# Patient Record
Sex: FEMALE | Race: OTHER | Employment: OTHER | RURAL
[De-identification: names, ages, dates, MRNs, and addresses within clinical notes are randomized per-mention and may not be internally consistent; named-entity substitution may affect disease eponyms.]

---

## 2018-02-26 NOTE — PATIENT DISCUSSION
New Prescription: TobraDex (tobramycin-dexamethasone): drops,suspension: 0.3-0.1% 1 drop four times a day into right eye 02-

## 2018-02-26 NOTE — PATIENT DISCUSSION
CONJUNCTIVITIS, VIRAL OD: BETADINE RINSE WITH PCP PER PT. PRESCRIBED TOBRADEX QID x 1 week. RECOMMENDED COOL COMPRESSES AND ATS BID/PRN. RETURN TO CLINIC AS SCHEDULED/SOONER IF SYMPTOMS INCREASE.

## 2018-03-13 NOTE — PATIENT DISCUSSION
CONJUNCTIVITIS, VIRAL OD:  RESOLVING. RECOMMENDED COOL COMPRESSES AND ATS BID/PRN. RETURN TO CLINIC AS SCHEDULED/SOONER IF SYMPTOMS INCREASE.

## 2018-03-13 NOTE — PATIENT DISCUSSION
SUPERFICIAL PUNCTATE KERATITIS OD: RX ARTIFICIAL TEARS AS NEEDED TO INCREASE COMFORT OD. RTC IF SYMPTOMS PERSIST.

## 2022-02-03 ENCOUNTER — NEW PATIENT (OUTPATIENT)
Dept: RURAL CLINIC 2 | Facility: CLINIC | Age: 62
End: 2022-02-03

## 2022-02-03 DIAGNOSIS — H52.223: ICD-10-CM

## 2022-02-03 DIAGNOSIS — Z79.4: ICD-10-CM

## 2022-02-03 DIAGNOSIS — H52.13: ICD-10-CM

## 2022-02-03 DIAGNOSIS — H52.4: ICD-10-CM

## 2022-02-03 DIAGNOSIS — E11.9: ICD-10-CM

## 2022-02-03 DIAGNOSIS — H25.813: ICD-10-CM

## 2022-02-03 PROCEDURE — 92004 COMPRE OPH EXAM NEW PT 1/>: CPT

## 2022-02-03 PROCEDURE — 92015 DETERMINE REFRACTIVE STATE: CPT

## 2022-02-03 ASSESSMENT — TONOMETRY
OS_IOP_MMHG: 18
OD_IOP_MMHG: 18

## 2022-02-03 ASSESSMENT — VISUAL ACUITY
OD_CC: 20/50-1
OS_PH: 20/40
OS_CC: 20/40-2
OD_PH: 20/25-2

## 2023-10-03 ENCOUNTER — ESTABLISHED PATIENT (OUTPATIENT)
Dept: RURAL CLINIC 2 | Facility: CLINIC | Age: 63
End: 2023-10-03

## 2023-10-03 DIAGNOSIS — E11.9: ICD-10-CM

## 2023-10-03 DIAGNOSIS — Z79.4: ICD-10-CM

## 2023-10-03 DIAGNOSIS — H25.813: ICD-10-CM

## 2023-10-03 DIAGNOSIS — H43.811: ICD-10-CM

## 2023-10-03 DIAGNOSIS — H43.822: ICD-10-CM

## 2023-10-03 PROCEDURE — 99214 OFFICE O/P EST MOD 30 MIN: CPT

## 2023-10-03 PROCEDURE — 92134 CPTRZ OPH DX IMG PST SGM RTA: CPT

## 2023-10-03 ASSESSMENT — VISUAL ACUITY
OD_PH: 20/30+1
OD_CC: 20/50-1
OS_CC: 20/30-1

## 2023-10-03 ASSESSMENT — TONOMETRY
OS_IOP_MMHG: 18
OD_IOP_MMHG: 18

## 2023-10-25 ENCOUNTER — CONSULTATION/EVALUATION (OUTPATIENT)
Dept: RURAL CLINIC 1 | Facility: CLINIC | Age: 63
End: 2023-10-25

## 2023-10-25 DIAGNOSIS — H25.813: ICD-10-CM

## 2023-10-25 PROCEDURE — 99214 OFFICE O/P EST MOD 30 MIN: CPT

## 2023-10-25 ASSESSMENT — TONOMETRY
OD_IOP_MMHG: 18
OS_IOP_MMHG: 18

## 2023-10-25 ASSESSMENT — VISUAL ACUITY
OS_PH: 20/30
OD_PH: 20/40
OS_AM: 20/20
OS_BAT: 20/40
OD_PAM: 20/20
OD_BAT: 20/50
OS_CC: 20/40
OS_SC: 20/40
OS_CC: 20/20
OD_SC: 20/50
OD_CC: 20/20
OD_CC: 20/40

## 2023-12-15 ENCOUNTER — PRE-OP/H&P (OUTPATIENT)
Dept: RURAL CLINIC 1 | Facility: CLINIC | Age: 63
End: 2023-12-15

## 2023-12-15 VITALS
HEART RATE: 64 BPM | BODY MASS INDEX: 33.66 KG/M2 | WEIGHT: 190 LBS | DIASTOLIC BLOOD PRESSURE: 76 MMHG | HEIGHT: 63 IN | SYSTOLIC BLOOD PRESSURE: 132 MMHG

## 2023-12-15 DIAGNOSIS — Z79.4: ICD-10-CM

## 2023-12-15 DIAGNOSIS — Z01.818: ICD-10-CM

## 2023-12-15 DIAGNOSIS — E11.9: ICD-10-CM

## 2023-12-15 DIAGNOSIS — K50.918: ICD-10-CM

## 2023-12-15 DIAGNOSIS — I10: ICD-10-CM

## 2023-12-15 PROCEDURE — 99213 OFFICE O/P EST LOW 20 MIN: CPT

## 2024-01-08 ENCOUNTER — SURGERY/PROCEDURE (OUTPATIENT)
Dept: URBAN - METROPOLITAN AREA SURGERY 3 | Facility: SURGERY | Age: 64
End: 2024-01-08

## 2024-01-08 DIAGNOSIS — H25.811: ICD-10-CM

## 2024-01-08 PROCEDURE — 99199PCV PROF CUSTOM VISION PACKAGE

## 2024-01-08 PROCEDURE — 66984CV REMOVE CATARACT, INSERT LENS, CUSTOM VISION

## 2024-01-15 ENCOUNTER — PRE-OP/H&P (OUTPATIENT)
Dept: RURAL CLINIC 1 | Facility: CLINIC | Age: 64
End: 2024-01-15

## 2024-01-15 ENCOUNTER — POST OP/EVAL OF SECOND EYE (OUTPATIENT)
Dept: RURAL CLINIC 1 | Facility: CLINIC | Age: 64
End: 2024-01-15

## 2024-01-15 VITALS
DIASTOLIC BLOOD PRESSURE: 82 MMHG | HEIGHT: 63 IN | SYSTOLIC BLOOD PRESSURE: 138 MMHG | HEART RATE: 71 BPM | WEIGHT: 190 LBS | BODY MASS INDEX: 33.66 KG/M2

## 2024-01-15 DIAGNOSIS — E11.9: ICD-10-CM

## 2024-01-15 DIAGNOSIS — Z01.818: ICD-10-CM

## 2024-01-15 DIAGNOSIS — I10: ICD-10-CM

## 2024-01-15 DIAGNOSIS — K50.918: ICD-10-CM

## 2024-01-15 DIAGNOSIS — Z79.4: ICD-10-CM

## 2024-01-15 DIAGNOSIS — H25.12: ICD-10-CM

## 2024-01-15 PROCEDURE — 99213 OFFICE O/P EST LOW 20 MIN: CPT

## 2024-01-15 ASSESSMENT — TONOMETRY
OD_IOP_MMHG: 17
OS_IOP_MMHG: 17

## 2024-01-15 ASSESSMENT — VISUAL ACUITY
OS_SC: 20/30
OD_SC: 20/20

## 2024-01-22 ENCOUNTER — POST-OP (OUTPATIENT)
Dept: RURAL CLINIC 1 | Facility: CLINIC | Age: 64
End: 2024-01-22

## 2024-01-22 ENCOUNTER — SURGERY/PROCEDURE (OUTPATIENT)
Dept: URBAN - METROPOLITAN AREA SURGERY 3 | Facility: SURGERY | Age: 64
End: 2024-01-22

## 2024-01-22 DIAGNOSIS — Z96.1: ICD-10-CM

## 2024-01-22 DIAGNOSIS — H25.812: ICD-10-CM

## 2024-01-22 PROCEDURE — 99024 POSTOP FOLLOW-UP VISIT: CPT

## 2024-01-22 PROCEDURE — 68841 INSJ RX ELUT IMPLT LAC CANAL: CPT

## 2024-01-22 PROCEDURE — S9986LRI LRI W/LENSX

## 2024-01-22 PROCEDURE — 66984CV REMOVE CATARACT, INSERT LENS, CUSTOM VISION

## 2024-01-22 PROCEDURE — 99199PCV PROF CUSTOM VISION PACKAGE

## 2024-01-22 ASSESSMENT — VISUAL ACUITY
OS_SC: 20/400 BLURRY
OD_SC: 20/25

## 2024-01-22 ASSESSMENT — TONOMETRY
OD_IOP_MMHG: 16
OS_IOP_MMHG: 20

## 2024-01-29 ENCOUNTER — POST-OP (OUTPATIENT)
Dept: RURAL CLINIC 1 | Facility: CLINIC | Age: 64
End: 2024-01-29

## 2024-01-29 DIAGNOSIS — Z96.1: ICD-10-CM

## 2024-01-29 PROCEDURE — 99024 POSTOP FOLLOW-UP VISIT: CPT

## 2024-01-29 ASSESSMENT — VISUAL ACUITY
OS_SC: 20/20
OU_SC: 20/20
OD_SC: 20/20

## 2024-02-20 ENCOUNTER — EMERGENCY VISIT (OUTPATIENT)
Dept: RURAL CLINIC 1 | Facility: CLINIC | Age: 64
End: 2024-02-20

## 2024-02-20 DIAGNOSIS — Z96.1: ICD-10-CM

## 2024-02-20 PROCEDURE — 99024 POSTOP FOLLOW-UP VISIT: CPT

## 2024-02-20 ASSESSMENT — VISUAL ACUITY
OS_SC: 20/50-1
OD_SC: 20/50-1
OU_SC: 20/25-1
OS_SC: 20/30
OU_SC: 20/50-1
OD_SC: 20/25-1

## 2024-03-20 ENCOUNTER — EMERGENCY VISIT (OUTPATIENT)
Dept: RURAL CLINIC 1 | Facility: CLINIC | Age: 64
End: 2024-03-20

## 2024-03-20 DIAGNOSIS — Z96.1: ICD-10-CM

## 2024-03-20 PROCEDURE — 99024 POSTOP FOLLOW-UP VISIT: CPT

## 2024-03-20 ASSESSMENT — TONOMETRY
OD_IOP_MMHG: 18
OS_IOP_MMHG: 18

## 2024-03-20 ASSESSMENT — VISUAL ACUITY
OD_SC: 20/30
OS_SC: 20/30
OU_SC: 20/25+2

## 2024-05-06 ENCOUNTER — FOLLOW UP (OUTPATIENT)
Dept: RURAL CLINIC 1 | Facility: CLINIC | Age: 64
End: 2024-05-06

## 2024-05-06 DIAGNOSIS — Z79.4: ICD-10-CM

## 2024-05-06 DIAGNOSIS — E11.9: ICD-10-CM

## 2024-05-06 DIAGNOSIS — H43.811: ICD-10-CM

## 2024-05-06 DIAGNOSIS — H43.822: ICD-10-CM

## 2024-05-06 DIAGNOSIS — Z96.1: ICD-10-CM

## 2024-05-06 PROCEDURE — 92014 COMPRE OPH EXAM EST PT 1/>: CPT

## 2024-05-06 ASSESSMENT — VISUAL ACUITY
OU_SC: 20/20
OD_SC: 20/20
OS_SC: 20/20

## 2024-05-06 ASSESSMENT — TONOMETRY
OD_IOP_MMHG: 16
OS_IOP_MMHG: 16

## 2025-05-07 ENCOUNTER — COMPREHENSIVE EXAM (OUTPATIENT)
Age: 65
End: 2025-05-07

## 2025-05-07 DIAGNOSIS — H16.223: ICD-10-CM

## 2025-05-07 DIAGNOSIS — Z79.4: ICD-10-CM

## 2025-05-07 DIAGNOSIS — E11.9: ICD-10-CM

## 2025-05-07 DIAGNOSIS — Z96.1: ICD-10-CM

## 2025-05-07 DIAGNOSIS — H26.493: ICD-10-CM

## 2025-05-07 DIAGNOSIS — H43.822: ICD-10-CM

## 2025-05-07 DIAGNOSIS — H43.811: ICD-10-CM

## 2025-05-07 PROCEDURE — 92134 CPTRZ OPH DX IMG PST SGM RTA: CPT

## 2025-05-07 PROCEDURE — 92014 COMPRE OPH EXAM EST PT 1/>: CPT
